# Patient Record
Sex: MALE | Race: ASIAN | NOT HISPANIC OR LATINO
[De-identification: names, ages, dates, MRNs, and addresses within clinical notes are randomized per-mention and may not be internally consistent; named-entity substitution may affect disease eponyms.]

---

## 2023-01-09 PROBLEM — Z00.00 ENCOUNTER FOR PREVENTIVE HEALTH EXAMINATION: Status: ACTIVE | Noted: 2023-01-09

## 2023-01-11 ENCOUNTER — APPOINTMENT (OUTPATIENT)
Dept: CARDIOLOGY | Facility: CLINIC | Age: 63
End: 2023-01-11
Payer: COMMERCIAL

## 2023-01-11 ENCOUNTER — NON-APPOINTMENT (OUTPATIENT)
Age: 63
End: 2023-01-11

## 2023-01-11 VITALS
RESPIRATION RATE: 16 BRPM | HEART RATE: 87 BPM | TEMPERATURE: 96.8 F | BODY MASS INDEX: 21.28 KG/M2 | OXYGEN SATURATION: 97 % | DIASTOLIC BLOOD PRESSURE: 91 MMHG | SYSTOLIC BLOOD PRESSURE: 136 MMHG | WEIGHT: 152 LBS | HEIGHT: 71 IN

## 2023-01-11 DIAGNOSIS — R20.0 ANESTHESIA OF SKIN: ICD-10-CM

## 2023-01-11 DIAGNOSIS — Z78.9 OTHER SPECIFIED HEALTH STATUS: ICD-10-CM

## 2023-01-11 DIAGNOSIS — E78.5 HYPERLIPIDEMIA, UNSPECIFIED: ICD-10-CM

## 2023-01-11 DIAGNOSIS — R00.2 PALPITATIONS: ICD-10-CM

## 2023-01-11 DIAGNOSIS — Z82.3 FAMILY HISTORY OF STROKE: ICD-10-CM

## 2023-01-11 PROCEDURE — 93306 TTE W/DOPPLER COMPLETE: CPT

## 2023-01-11 PROCEDURE — 93000 ELECTROCARDIOGRAM COMPLETE: CPT

## 2023-01-20 ENCOUNTER — NON-APPOINTMENT (OUTPATIENT)
Age: 63
End: 2023-01-20

## 2023-01-29 PROBLEM — R20.0 FACIAL NUMBNESS: Status: ACTIVE | Noted: 2023-01-11

## 2023-02-12 ENCOUNTER — FORM ENCOUNTER (OUTPATIENT)
Age: 63
End: 2023-02-12

## 2023-02-15 ENCOUNTER — NON-APPOINTMENT (OUTPATIENT)
Age: 63
End: 2023-02-15

## 2023-02-17 PROBLEM — Z78.9 SOCIAL ALCOHOL USE: Status: ACTIVE | Noted: 2023-02-17

## 2023-02-17 PROBLEM — E78.5 HLD (HYPERLIPIDEMIA): Status: ACTIVE | Noted: 2023-02-17

## 2023-02-17 PROBLEM — Z82.3 FAMILY HISTORY OF CEREBROVASCULAR ACCIDENT (CVA): Status: ACTIVE | Noted: 2023-02-17

## 2023-02-17 RX ORDER — ATORVASTATIN CALCIUM 10 MG/1
10 TABLET, FILM COATED ORAL
Refills: 0 | Status: ACTIVE | COMMUNITY

## 2023-02-17 NOTE — HISTORY OF PRESENT ILLNESS
[FreeTextEntry1] : 62 year-old male with with HLD and strong family history of stroke presents for evaluation of palpitations.  \par \par Patient just came back from [place].  Patient has been waking up at night palpitations described as fast heartbeats lasting hours.  Patient denies CP or SOB.  Patient denies h/o syncope.  Patient reports persistent cough recently and was started on GI medication by PCP.  Patient reports arm and leg numbness.  Patient reports occasional right facial numbness.  Patient reports increased stress.  He is an  at [employer].  I advised patient to undergo a brain MRI.  I advised patient to undergo an echocardiogram.  I advised patient to wear a 7-day event monitor. \par \par He is on Atorvastatin 10 mg for HLD.

## 2023-04-03 ENCOUNTER — OFFICE (OUTPATIENT)
Dept: URBAN - METROPOLITAN AREA CLINIC 122 | Facility: CLINIC | Age: 63
Setting detail: OPHTHALMOLOGY
End: 2023-04-03
Payer: COMMERCIAL

## 2023-04-03 DIAGNOSIS — E11.9: ICD-10-CM

## 2023-04-03 DIAGNOSIS — H35.40: ICD-10-CM

## 2023-04-03 DIAGNOSIS — H25.13: ICD-10-CM

## 2023-04-03 DIAGNOSIS — H40.013: ICD-10-CM

## 2023-04-03 DIAGNOSIS — H52.7: ICD-10-CM

## 2023-04-03 PROCEDURE — 92083 EXTENDED VISUAL FIELD XM: CPT | Performed by: OPHTHALMOLOGY

## 2023-04-03 PROCEDURE — 92015 DETERMINE REFRACTIVE STATE: CPT | Performed by: OPHTHALMOLOGY

## 2023-04-03 PROCEDURE — 92004 COMPRE OPH EXAM NEW PT 1/>: CPT | Performed by: OPHTHALMOLOGY

## 2023-04-03 PROCEDURE — 92133 CPTRZD OPH DX IMG PST SGM ON: CPT | Performed by: OPHTHALMOLOGY

## 2023-04-03 PROCEDURE — 76514 ECHO EXAM OF EYE THICKNESS: CPT | Performed by: OPHTHALMOLOGY

## 2023-04-03 PROCEDURE — 92020 GONIOSCOPY: CPT | Performed by: OPHTHALMOLOGY

## 2023-04-03 ASSESSMENT — TONOMETRY
OS_IOP_MMHG: 18
OD_IOP_MMHG: 20

## 2023-04-03 ASSESSMENT — REFRACTION_CURRENTRX
OD_ADD: +2.50
OD_AXIS: 40
OD_CYLINDER: -0.50
OD_OVR_VA: 20/
OD_AXIS: 65
OD_OVR_VA: 20/
OS_VPRISM_DIRECTION: SV
OS_SPHERE: -10.75
OS_CYLINDER: -0.50
OS_AXIS: 20
OD_SPHERE: -10.75
OD_CYLINDER: -0.50
OD_SPHERE: -10.50
OS_OVR_VA: 20/
OS_OVR_VA: 20/
OS_SPHERE: -10.75
OS_ADD: +2.50
OS_CYLINDER: SPH
OD_VPRISM_DIRECTION: SV
OS_OVR_VA: 20/
OD_OVR_VA: 20/

## 2023-04-03 ASSESSMENT — REFRACTION_MANIFEST
OD_AXIS: 65
OS_AXIS: 20
OD_CYLINDER: -0.50
OS_AXIS: 20
OS_VA1: 20/25
OD_SPHERE: -10.50
OD_ADD: +2.75
OS_CYLINDER: -0.50
OD_SPHERE: -10.50
OD_CYLINDER: -0.50
OS_CYLINDER: -0.50
OD_VA1: 20/25
OS_ADD: +2.75
OS_SPHERE: -11.25
OD_AXIS: 65
OS_SPHERE: -10.75

## 2023-04-03 ASSESSMENT — PACHYMETRY
OS_CT_UM: 533
OD_CT_UM: 527
OS_CT_CORRECTION: 1
OD_CT_CORRECTION: 1

## 2023-04-03 ASSESSMENT — REFRACTION_AUTOREFRACTION
OS_CYLINDER: -0.50
OD_AXIS: 76
OD_SPHERE: -11.00
OD_CYLINDER: -0.75
OS_SPHERE: -12.25
OS_AXIS: 10

## 2023-04-03 ASSESSMENT — SPHEQUIV_DERIVED
OS_SPHEQUIV: -11.5
OD_SPHEQUIV: -10.75
OS_SPHEQUIV: -12.5
OS_SPHEQUIV: -11
OD_SPHEQUIV: -11.375
OD_SPHEQUIV: -10.75

## 2023-04-03 ASSESSMENT — CONFRONTATIONAL VISUAL FIELD TEST (CVF)
OD_FINDINGS: FULL
OS_FINDINGS: FULL

## 2023-04-03 ASSESSMENT — VISUAL ACUITY
OS_BCVA: 20/30+
OD_BCVA: 20/30

## 2024-07-25 ENCOUNTER — OFFICE (OUTPATIENT)
Facility: LOCATION | Age: 64
Setting detail: OPHTHALMOLOGY
End: 2024-07-25
Payer: COMMERCIAL

## 2024-07-25 DIAGNOSIS — H25.13: ICD-10-CM

## 2024-07-25 DIAGNOSIS — H40.013: ICD-10-CM

## 2024-07-25 DIAGNOSIS — H35.40: ICD-10-CM

## 2024-07-25 PROBLEM — I67.1 CEREBRAL ANEURYSM,NONRUPTURED: Status: ACTIVE | Noted: 2024-07-25

## 2024-07-25 PROCEDURE — 92133 CPTRZD OPH DX IMG PST SGM ON: CPT | Performed by: OPHTHALMOLOGY

## 2024-07-25 PROCEDURE — 99213 OFFICE O/P EST LOW 20 MIN: CPT | Performed by: OPHTHALMOLOGY

## 2024-07-25 PROCEDURE — 92083 EXTENDED VISUAL FIELD XM: CPT | Performed by: OPHTHALMOLOGY

## 2025-08-14 ENCOUNTER — OFFICE (OUTPATIENT)
Dept: URBAN - METROPOLITAN AREA CLINIC 122 | Facility: CLINIC | Age: 65
Setting detail: OPHTHALMOLOGY
End: 2025-08-14
Payer: COMMERCIAL

## 2025-08-14 DIAGNOSIS — H52.13: ICD-10-CM

## 2025-08-14 DIAGNOSIS — H25.13: ICD-10-CM

## 2025-08-14 DIAGNOSIS — H35.40: ICD-10-CM

## 2025-08-14 DIAGNOSIS — H16.223: ICD-10-CM

## 2025-08-14 DIAGNOSIS — I67.1: ICD-10-CM

## 2025-08-14 DIAGNOSIS — H40.013: ICD-10-CM

## 2025-08-14 PROCEDURE — 92015 DETERMINE REFRACTIVE STATE: CPT | Performed by: OPHTHALMOLOGY

## 2025-08-14 PROCEDURE — 92133 CPTRZD OPH DX IMG PST SGM ON: CPT | Performed by: OPHTHALMOLOGY

## 2025-08-14 PROCEDURE — 92012 INTRM OPH EXAM EST PATIENT: CPT | Performed by: OPHTHALMOLOGY

## 2025-08-14 PROCEDURE — 92083 EXTENDED VISUAL FIELD XM: CPT | Performed by: OPHTHALMOLOGY

## 2025-08-14 ASSESSMENT — REFRACTION_MANIFEST
OD_CYLINDER: -0.25
OS_AXIS: 20
OD_CYLINDER: -0.50
OD_CYLINDER: -0.75
OD_AXIS: 067
OS_AXIS: 014
OD_VA1: 20/25-1
OS_VA1: 20/25
OD_SPHERE: -10.50
OD_VA1: 20/25
OS_ADD: +2.75
OD_ADD: +2.75
OS_CYLINDER: -0.75
OS_CYLINDER: -0.50
OS_SPHERE: -11.50
OD_SPHERE: -10.50
OD_AXIS: 90
OS_VA1: 20/30-1
OD_VA1: 20/25
OS_SPHERE: -11.25
OD_AXIS: 65
OS_VA1: 20/25-2
OD_SPHERE: -11.00
OS_SPHERE: -11.75

## 2025-08-14 ASSESSMENT — TONOMETRY
OS_IOP_MMHG: 17
OD_IOP_MMHG: 17

## 2025-08-14 ASSESSMENT — REFRACTION_CURRENTRX
OD_VPRISM_DIRECTION: SV
OD_OVR_VA: 20/
OD_OVR_VA: 20/
OD_SPHERE: -10.50
OD_AXIS: 65
OD_CYLINDER: -0.50
OD_OVR_VA: 20/
OS_OVR_VA: 20/
OD_SPHERE: -10.75
OS_SPHERE: -11.50
OD_CYLINDER: -0.50
OS_VPRISM_DIRECTION: SV
OS_CYLINDER: -0.50
OD_CYLINDER: -0.25
OD_AXIS: 40
OS_AXIS: 20
OS_CYLINDER: SPH
OS_OVR_VA: 20/
OD_ADD: +2.50
OS_AXIS: 14
OS_OVR_VA: 20/
OS_ADD: +2.50
OS_SPHERE: -10.75
OS_SPHERE: -10.75
OD_AXIS: 67
OS_CYLINDER: -0.25
OD_SPHERE: -10.75

## 2025-08-14 ASSESSMENT — PACHYMETRY
OS_CT_CORRECTION: 1
OD_CT_UM: 527
OD_CT_CORRECTION: 1
OS_CT_UM: 533

## 2025-08-14 ASSESSMENT — REFRACTION_AUTOREFRACTION
OS_AXIS: 178
OS_CYLINDER: -0.75
OS_SPHERE: -12.50
OD_SPHERE: -10.75
OD_CYLINDER: -1.00
OD_AXIS: 88

## 2025-08-14 ASSESSMENT — KERATOMETRY
OD_AXISANGLE_DEGREES: 078
OS_K1POWER_DIOPTERS: 44.25
OD_K1POWER_DIOPTERS: 44.75
OD_K2POWER_DIOPTERS: 44.50
OS_K2POWER_DIOPTERS: 44.75
METHOD_AUTO_MANUAL: AUTO
OS_AXISANGLE_DEGREES: 124

## 2025-08-14 ASSESSMENT — VISUAL ACUITY
OD_BCVA: 20/30
OS_BCVA: 20/25-2

## 2025-08-14 ASSESSMENT — TEAR BREAK UP TIME (TBUT)
OD_TBUT: 2+
OS_TBUT: 2+

## 2025-08-29 ENCOUNTER — OFFICE (OUTPATIENT)
Dept: URBAN - METROPOLITAN AREA CLINIC 122 | Facility: CLINIC | Age: 65
Setting detail: OPHTHALMOLOGY
End: 2025-08-29
Payer: COMMERCIAL

## 2025-08-29 DIAGNOSIS — H52.4: ICD-10-CM

## 2025-08-29 PROBLEM — H16.223 DRY EYE SYNDROME K SICCA; BOTH EYES: Status: ACTIVE | Noted: 2025-08-14

## 2025-08-29 PROCEDURE — N/C NO CHARGE: Performed by: OPHTHALMOLOGY

## 2025-08-29 ASSESSMENT — REFRACTION_CURRENTRX
OS_ADD: +2.50
OS_VPRISM_DIRECTION: SV
OS_AXIS: 14
OS_CYLINDER: -0.25
OD_CYLINDER: -0.50
OD_AXIS: 67
OD_SPHERE: -10.50
OD_VPRISM_DIRECTION: SV
OD_ADD: +2.50
OS_OVR_VA: 20/
OD_SPHERE: -10.75
OS_CYLINDER: SPH
OD_AXIS: 40
OD_CYLINDER: -0.50
OS_SPHERE: -10.75
OS_OVR_VA: 20/
OS_SPHERE: -11.50
OD_AXIS: 65
OS_CYLINDER: -0.50
OD_SPHERE: -10.75
OD_CYLINDER: -0.25
OS_SPHERE: -10.75
OD_OVR_VA: 20/
OD_OVR_VA: 20/
OS_AXIS: 20
OD_OVR_VA: 20/
OS_OVR_VA: 20/

## 2025-08-29 ASSESSMENT — REFRACTION_MANIFEST
OD_CYLINDER: -0.75
OD_VA1: 20/25-1
OS_ADD: +2.75
OS_AXIS: 014
OS_CYLINDER: -0.50
OD_AXIS: 067
OS_CYLINDER: -0.75
OD_AXIS: 90
OS_CYLINDER: SPH
OD_VA1: 20/25
OS_VA1: 20/30-1
OS_SPHERE: -11.50
OD_SPHERE: -10.50
OD_SPHERE: -11.00
OD_SPHERE: -10.50
OD_CYLINDER: -0.25
OS_SPHERE: -11.75
OS_VA1: 20/25-2
OD_ADD: +2.75
OS_AXIS: 20
OD_VA1: 20/25
OS_SPHERE: -11.25
OD_CYLINDER: -0.50
OS_VA1: 20/25
OD_AXIS: 65
OS_ADD: +2.75
OD_ADD: +2.75

## 2025-08-29 ASSESSMENT — VISUAL ACUITY
OD_BCVA: 20/30
OS_BCVA: 20/25-2

## 2025-08-29 ASSESSMENT — KERATOMETRY
OD_AXISANGLE_DEGREES: 078
OS_AXISANGLE_DEGREES: 124
OD_K2POWER_DIOPTERS: 44.50
OS_K2POWER_DIOPTERS: 44.75
OS_K1POWER_DIOPTERS: 44.25
OD_K1POWER_DIOPTERS: 44.75
METHOD_AUTO_MANUAL: AUTO

## 2025-08-29 ASSESSMENT — REFRACTION_AUTOREFRACTION
OS_CYLINDER: -0.75
OS_AXIS: 178
OD_CYLINDER: -1.00
OS_SPHERE: -12.50
OD_AXIS: 88
OD_SPHERE: -10.75